# Patient Record
Sex: FEMALE | Race: BLACK OR AFRICAN AMERICAN | Employment: OTHER
[De-identification: names, ages, dates, MRNs, and addresses within clinical notes are randomized per-mention and may not be internally consistent; named-entity substitution may affect disease eponyms.]

---

## 2023-11-16 ENCOUNTER — HOSPITAL ENCOUNTER (OUTPATIENT)
Facility: HOSPITAL | Age: 23
Setting detail: RECURRING SERIES
Discharge: HOME | End: 2023-11-19
Payer: OTHER GOVERNMENT

## 2023-11-16 PROCEDURE — 97110 THERAPEUTIC EXERCISES: CPT

## 2023-11-16 PROCEDURE — 97162 PT EVAL MOD COMPLEX 30 MIN: CPT

## 2023-11-16 PROCEDURE — 97112 NEUROMUSCULAR REEDUCATION: CPT

## 2023-11-16 NOTE — PROGRESS NOTES
[facility] [address] Jacquelin Carlton, [address] [facility]  Ph [phone]  Fx [phone]    Plan of Care/ Statement of Necessity for Physical Therapy Services      Patient name: Kika Murillo Start of Care: 2023   Referral source: GLADIS Martinez NP : 2000    Medical Diagnosis: Other symptoms and signs involving the genitourinary system [R39.89]   Onset Date:23    Treatment Diagnosis: M54.59  OTHER LOWER BACK PAIN     Prior Hospitalization: see medical history Provider#: [id number]   Medications: Verified on Patient summary List   Comorbidities: 2/2 both vaginal  (, )  Prior Level of Function: prior to 6 months pregnant, functionally independent without limitations from pain, active duty [employer]      The [employer] and following information is based on the information from the initial evaluation. Assessment/ key information:  Patient is a 26year old female presenting to Physical Therapy with c/o low back, SIJ, groin, and pelvic girlde pain which is limiting ability function at previous level. Patient's pain began 7 months ago, continuing after vaginal delivery of second child on 10/21/23. Patient presents with symptoms consistent with musculoskeletal pain due to poor lumbopelvic mechanics/coordination and lack of hip extension ROM contributing to pain. Patient presents with decreased LE strength, poor lumbar coordination, decreased lumbar ROM, and impaired LE biomechanics. I feel patient would benefit from skilled therapeutic intervention to optimize highest functional level possible. Evaluation Complexity HistoryMEDIUM  Complexity : 1-2 comorbidities / personal factors will impact the outcome/ POC  ; Examination HIGH Complexity : 4+ Standardized tests and measures addressing body structure, function, activity limitation and / or participation in recreation  ;Presentation MEDIUM Complexity : Evolving with changing characteristics  ; Clinical Decision Making MEDIUM

## 2023-11-16 NOTE — PROGRESS NOTES
Physical Therapy Evaluation        Patient Name: Sultana Seals    Date: 2023    : 2000  Insurance: Payor:  EAST / Plan:  EAST / Product Type: *No Product type* /      Patient  verified yes     Visit #   Current / Total 1 16   Time   In / Out 11:50 12:33   Pain   In / Out 5-6 4-5   Subjective Functional Status/Changes: See below   Changes to:  Meds, Allergies, Med Hx, Sx Hx? If yes, update Summary List yes       TREATMENT AREA =  M54.59  OTHER LOWER BACK PAIN      SUBJECTIVE   Primary Complaint and History:   Pt presents to PT with c/o low back, SIJ, groin, and pelvic girdle pain. Symptoms onset 6 months pregnancy- delivered 10/21/23. symptoms are []improving []worsening [x]no change since onset  Mechanism of Injury: pregnancy    Previous Treatment/Compliance:   Stretches, no medications      Pain  Severity: 5-6 current. 8-9/10 at worst. 3/10 at best.   Location: bilateral groin, pubic symphysisi, SIJ, and low back  Quality: aching, cramping, throbbing, and sore  Alleviation: shifting    Function  PLOF: prior to 6 months pregnant, functionally independent without limitations from pain, active duty [employer]  Current Level of Function: shifts a lot due to inability to stay in one position for too long, including sitting > 10 min, walking more than 15 minutes, and standing > 5-7 minutes without pain; pain with cleaning home and arrying heavy items; childcare; sleep interrupted by pain    Medical History:  Pregnancies/ Births: 2/2 both vaginal  (, )  Other Obstetrical/Gynecological History: none  Menopausal status: none    PMHx/Comorbidities: none  Surgical History: none    Pertinent History:  Work status: 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  No  Self-reported health status:

## 2023-11-17 ENCOUNTER — TELEPHONE (OUTPATIENT)
Facility: HOSPITAL | Age: 23
End: 2023-11-17

## 2023-11-17 ENCOUNTER — HOSPITAL ENCOUNTER (OUTPATIENT)
Facility: HOSPITAL | Age: 23
Setting detail: RECURRING SERIES
End: 2023-11-17
Payer: OTHER GOVERNMENT

## 2023-11-30 ENCOUNTER — HOSPITAL ENCOUNTER (OUTPATIENT)
Facility: HOSPITAL | Age: 23
Setting detail: RECURRING SERIES
End: 2023-11-30
Payer: OTHER GOVERNMENT

## 2023-11-30 PROCEDURE — 97112 NEUROMUSCULAR REEDUCATION: CPT

## 2023-11-30 PROCEDURE — 97110 THERAPEUTIC EXERCISES: CPT

## 2023-11-30 PROCEDURE — 97530 THERAPEUTIC ACTIVITIES: CPT

## 2023-11-30 NOTE — PROGRESS NOTES
PHYSICAL / OCCUPATIONAL THERAPY - DAILY TREATMENT NOTE (updated )    Patient Name: Giovanni Angeles    Date: 2023    : 2000  Insurance: Payor:  EAST / Plan: HAUL EAST / Product Type: *No Product type* /      Patient  verified Yes   Visit #   Current / Total 2 16   Time   In / Out 2:35 3:12   Pain   In / Out 6.5 5   Subjective Functional Status/Changes: Patient reports she is in more pain du eto riding in back seat to and from [place] for holidays. Was not consistent daily with HEP. Changes to: Allergies, Med Hx, Sx Hx?   no       TREATMENT AREA =  Other symptoms and signs involving the genitourinary system [R39.89]    OBJECTIVE    Therapeutic Procedures: Tx Min Billable or 1:1 Min (if diff from Tx Min) Procedure, Rationale, Specifics   10  [id number] Therapeutic Exercise (timed):  increase ROM, strength, coordination, balance, and proprioception to improve patient's ability to progress to PLOF and address remaining functional goals. (see flow sheet as applicable)    Details if applicable:       15  45431 Neuromuscular Re-Education (timed):  improve balance, coordination, kinesthetic sense, posture, core stability and proprioception to improve patient's ability to develop conscious control of individual muscles and awareness of position of extremities in order to progress to PLOF and address remaining functional goals. (see flow sheet as applicable)    Details if applicable:     12  78273 Therapeutic Activity (timed):  use of dynamic activities replicating functional movements to increase ROM, strength, coordination, balance, and proprioception in order to improve patient's ability to progress to PLOF and address remaining functional goals.   (see flow sheet as applicable)     Details if applicable:           Details if applicable:            Details if applicable:     40  [facility] Totals Reminder: bill using total billable min of TIMED therapeutic procedures (example: do not include dry

## 2023-12-01 ENCOUNTER — TELEPHONE (OUTPATIENT)
Facility: HOSPITAL | Age: 23
End: 2023-12-01

## 2023-12-01 ENCOUNTER — HOSPITAL ENCOUNTER (OUTPATIENT)
Facility: HOSPITAL | Age: 23
Setting detail: RECURRING SERIES
End: 2023-12-01
Payer: OTHER GOVERNMENT

## 2023-12-05 ENCOUNTER — HOSPITAL ENCOUNTER (OUTPATIENT)
Facility: HOSPITAL | Age: 23
Setting detail: RECURRING SERIES
Discharge: HOME | End: 2023-12-08
Payer: OTHER GOVERNMENT

## 2023-12-05 PROCEDURE — 97140 MANUAL THERAPY 1/> REGIONS: CPT

## 2023-12-05 PROCEDURE — 97530 THERAPEUTIC ACTIVITIES: CPT

## 2023-12-05 PROCEDURE — 97110 THERAPEUTIC EXERCISES: CPT

## 2023-12-05 NOTE — PROGRESS NOTES
PHYSICAL / OCCUPATIONAL THERAPY - DAILY TREATMENT NOTE (updated )    Patient Name: Agnes Mills    Date: 2023    : 2000  Insurance: Payor: [employer] EAST / Plan: [employer] EAST / Product Type: *No Product type* /      Patient  verified Yes     Visit #   Current / Total 3 16   Time   In / Out 320 405   Pain   In / Out 6.5 0   Subjective Functional Status/Changes: Pt reports good compliance with HEP   Changes to: Allergies, Med Hx, Sx Hx?   no       TREATMENT AREA =  Other symptoms and signs involving the genitourinary system [R39.89]    OBJECTIVE      Therapeutic Procedures: Tx Min Billable or 1:1 Min (if diff from Tx Min) Procedure, Rationale, Specifics   [phone] [id number] Therapeutic Exercise (timed):  increase ROM, strength, coordination, balance, and proprioception to improve patient's ability to progress to PLOF and address remaining functional goals. (see flow sheet as applicable)    Details if applicable:       8  18409 Therapeutic Activity (timed):  use of dynamic activities replicating functional movements to increase ROM, strength, coordination, balance, and proprioception in order to improve patient's ability to progress to PLOF and address remaining functional goals. (see flow sheet as applicable)    Details if applicable:      70809 Manual Therapy (timed):  decrease pain, increase ROM, and increase tissue extensibility to improve patient's ability to progress to PLOF and address remaining functional goals. The manual therapy interventions were performed at a separate and distinct time from the therapeutic activities interventions .  Details: MFR, skin rolling, cupping      Details if applicable:           Details if applicable:            Details if applicable:     [phone] [facility] Reminder: bill using total billable min of TIMED therapeutic procedures (example: do not include dry needle or estim unattended, both untimed codes, in totals to left)  8-22 min = 1 unit; 23-37 min = 2

## 2023-12-07 ENCOUNTER — HOSPITAL ENCOUNTER (OUTPATIENT)
Facility: HOSPITAL | Age: 23
Setting detail: RECURRING SERIES
Discharge: HOME | End: 2023-12-10
Payer: OTHER GOVERNMENT

## 2023-12-07 PROCEDURE — 97112 NEUROMUSCULAR REEDUCATION: CPT

## 2023-12-07 PROCEDURE — 97110 THERAPEUTIC EXERCISES: CPT

## 2023-12-07 PROCEDURE — 97140 MANUAL THERAPY 1/> REGIONS: CPT

## 2023-12-07 NOTE — PROGRESS NOTES
PHYSICAL / OCCUPATIONAL THERAPY - DAILY TREATMENT NOTE (updated )    Patient Name: Destin Yin    Date: 2023    : 2000  Insurance: Payor:  EAST / Plan:  EAST / Product Type: *No Product type* /      Patient  verified Yes     Visit #   Current / Total 4 16   Time   In / Out 3:15 3:50   Pain   In / Out 2 0   Subjective Functional Status/Changes: Patient reports improvement in pain since previous session. Changes to: Allergies, Med Hx, Sx Hx?   no       TREATMENT AREA =  Other symptoms and signs involving the genitourinary system [R39.89]    OBJECTIVE    Therapeutic Procedures: Tx Min Billable or 1:1 Min (if diff from Tx Min) Procedure, Rationale, Specifics   12  31343 Therapeutic Exercise (timed):  increase ROM, strength, coordination, balance, and proprioception to improve patient's ability to progress to PLOF and address remaining functional goals. (see flow sheet as applicable)    Details if applicable:       13  07457 Neuromuscular Re-Education (timed):  improve balance, coordination, kinesthetic sense, posture, core stability and proprioception to improve patient's ability to develop conscious control of individual muscles and awareness of position of extremities in order to progress to PLOF and address remaining functional goals. (see flow sheet as applicable)    Details if applicable:          10  09581 Manual Therapy (timed):  decrease pain and increase tissue extensibility to improve patient's ability to progress to PLOF and address remaining functional goals. The manual therapy interventions were performed at a separate and distinct time from the therapeutic activities interventions .  Details:      Details if applicable:  MFR lumbar muscles, patient in prone with hips on pillow          Details if applicable:     28  [facility] Totals Reminder: bill using total billable min of TIMED therapeutic procedures (example: do not include dry needle or estim unattended, both

## 2023-12-12 ENCOUNTER — HOSPITAL ENCOUNTER (OUTPATIENT)
Facility: HOSPITAL | Age: 23
Setting detail: RECURRING SERIES
End: 2023-12-12
Payer: OTHER GOVERNMENT

## 2023-12-12 ENCOUNTER — TELEPHONE (OUTPATIENT)
Facility: HOSPITAL | Age: 23
End: 2023-12-12

## 2023-12-12 NOTE — TELEPHONE ENCOUNTER
Pt called to cxl due to not having a sitter, let patient know she can bring her baby in.  She chose to [facility]

## 2023-12-15 ENCOUNTER — TELEPHONE (OUTPATIENT)
Facility: HOSPITAL | Age: 23
End: 2023-12-15

## 2023-12-15 NOTE — TELEPHONE ENCOUNTER
Patient no showed. Therapist tries to call patient and inform her that we are discharging her due to [address]; however, mailbox is full and therapist is unable to leave voice message.